# Patient Record
Sex: MALE | Race: BLACK OR AFRICAN AMERICAN | NOT HISPANIC OR LATINO | Employment: FULL TIME | URBAN - METROPOLITAN AREA
[De-identification: names, ages, dates, MRNs, and addresses within clinical notes are randomized per-mention and may not be internally consistent; named-entity substitution may affect disease eponyms.]

---

## 2022-12-12 ENCOUNTER — HOSPITAL ENCOUNTER (EMERGENCY)
Facility: HOSPITAL | Age: 52
Discharge: HOME | End: 2022-12-12
Attending: EMERGENCY MEDICINE

## 2022-12-12 ENCOUNTER — APPOINTMENT (EMERGENCY)
Dept: RADIOLOGY | Facility: HOSPITAL | Age: 52
End: 2022-12-12

## 2022-12-12 VITALS
OXYGEN SATURATION: 98 % | WEIGHT: 255.7 LBS | TEMPERATURE: 97.9 F | RESPIRATION RATE: 18 BRPM | DIASTOLIC BLOOD PRESSURE: 73 MMHG | HEIGHT: 73 IN | SYSTOLIC BLOOD PRESSURE: 121 MMHG | HEART RATE: 78 BPM | BODY MASS INDEX: 33.89 KG/M2

## 2022-12-12 DIAGNOSIS — R07.9 CHEST PAIN: Primary | ICD-10-CM

## 2022-12-12 LAB
2HR DELTA HS TROPONIN: <0 NG/L
ALBUMIN SERPL BCP-MCNC: 3.9 G/DL (ref 3.5–5)
ALP SERPL-CCNC: 92 U/L (ref 46–116)
ALT SERPL W P-5'-P-CCNC: 36 U/L (ref 12–78)
ANION GAP SERPL CALCULATED.3IONS-SCNC: 10 MMOL/L (ref 4–13)
APTT PPP: 25 SECONDS (ref 23–37)
AST SERPL W P-5'-P-CCNC: 21 U/L (ref 5–45)
BASOPHILS # BLD AUTO: 0.03 THOUSANDS/ÂΜL (ref 0–0.1)
BASOPHILS NFR BLD AUTO: 0 % (ref 0–1)
BILIRUB SERPL-MCNC: 1.28 MG/DL (ref 0.2–1)
BUN SERPL-MCNC: 15 MG/DL (ref 5–25)
CALCIUM SERPL-MCNC: 8.4 MG/DL (ref 8.3–10.1)
CARDIAC TROPONIN I PNL SERPL HS: 2 NG/L
CARDIAC TROPONIN I PNL SERPL HS: <2 NG/L
CHLORIDE SERPL-SCNC: 102 MMOL/L (ref 96–108)
CO2 SERPL-SCNC: 27 MMOL/L (ref 21–32)
CREAT SERPL-MCNC: 1.02 MG/DL (ref 0.6–1.3)
D DIMER PPP FEU-MCNC: 0.34 UG/ML FEU
EOSINOPHIL # BLD AUTO: 0.14 THOUSAND/ÂΜL (ref 0–0.61)
EOSINOPHIL NFR BLD AUTO: 1 % (ref 0–6)
ERYTHROCYTE [DISTWIDTH] IN BLOOD BY AUTOMATED COUNT: 12.4 % (ref 11.6–15.1)
GFR SERPL CREATININE-BSD FRML MDRD: 84 ML/MIN/1.73SQ M
GLUCOSE SERPL-MCNC: 122 MG/DL (ref 65–140)
HCT VFR BLD AUTO: 42.4 % (ref 36.5–49.3)
HGB BLD-MCNC: 14.5 G/DL (ref 12–17)
IMM GRANULOCYTES # BLD AUTO: 0.03 THOUSAND/UL (ref 0–0.2)
IMM GRANULOCYTES NFR BLD AUTO: 0 % (ref 0–2)
INR PPP: 1.02 (ref 0.84–1.19)
LYMPHOCYTES # BLD AUTO: 1.98 THOUSANDS/ÂΜL (ref 0.6–4.47)
LYMPHOCYTES NFR BLD AUTO: 20 % (ref 14–44)
MAGNESIUM SERPL-MCNC: 2 MG/DL (ref 1.6–2.6)
MCH RBC QN AUTO: 30.5 PG (ref 26.8–34.3)
MCHC RBC AUTO-ENTMCNC: 34.2 G/DL (ref 31.4–37.4)
MCV RBC AUTO: 89 FL (ref 82–98)
MONOCYTES # BLD AUTO: 0.68 THOUSAND/ÂΜL (ref 0.17–1.22)
MONOCYTES NFR BLD AUTO: 7 % (ref 4–12)
NEUTROPHILS # BLD AUTO: 6.83 THOUSANDS/ÂΜL (ref 1.85–7.62)
NEUTS SEG NFR BLD AUTO: 72 % (ref 43–75)
NRBC BLD AUTO-RTO: 0 /100 WBCS
PLATELET # BLD AUTO: 296 THOUSANDS/UL (ref 149–390)
PMV BLD AUTO: 9.5 FL (ref 8.9–12.7)
POTASSIUM SERPL-SCNC: 3.4 MMOL/L (ref 3.5–5.3)
PROT SERPL-MCNC: 7.3 G/DL (ref 6.4–8.4)
PROTHROMBIN TIME: 13.5 SECONDS (ref 11.6–14.5)
RBC # BLD AUTO: 4.76 MILLION/UL (ref 3.88–5.62)
SODIUM SERPL-SCNC: 139 MMOL/L (ref 135–147)
WBC # BLD AUTO: 9.69 THOUSAND/UL (ref 4.31–10.16)

## 2022-12-12 RX ORDER — AMLODIPINE BESYLATE 10 MG/1
1 TABLET ORAL DAILY
COMMUNITY
Start: 2022-07-18

## 2022-12-12 RX ORDER — POTASSIUM CHLORIDE 20 MEQ/1
20 TABLET, EXTENDED RELEASE ORAL ONCE
Status: COMPLETED | OUTPATIENT
Start: 2022-12-12 | End: 2022-12-12

## 2022-12-12 RX ORDER — NITROGLYCERIN 0.4 MG/1
0.4 TABLET SUBLINGUAL
Status: DISCONTINUED | OUTPATIENT
Start: 2022-12-12 | End: 2022-12-13 | Stop reason: HOSPADM

## 2022-12-12 RX ORDER — LOSARTAN POTASSIUM 50 MG/1
1 TABLET ORAL DAILY
COMMUNITY
Start: 2022-07-18

## 2022-12-12 RX ORDER — ASPIRIN 81 MG/1
324 TABLET, CHEWABLE ORAL ONCE
Status: COMPLETED | OUTPATIENT
Start: 2022-12-12 | End: 2022-12-12

## 2022-12-12 RX ADMIN — NITROGLYCERIN 0.4 MG: 0.4 TABLET SUBLINGUAL at 20:45

## 2022-12-12 RX ADMIN — ASPIRIN 81 MG CHEWABLE TABLET 324 MG: 81 TABLET CHEWABLE at 20:45

## 2022-12-12 RX ADMIN — POTASSIUM CHLORIDE 20 MEQ: 1500 TABLET, EXTENDED RELEASE ORAL at 21:47

## 2022-12-13 LAB
ATRIAL RATE: 95 BPM
P AXIS: 23 DEGREES
PR INTERVAL: 162 MS
QRS AXIS: 27 DEGREES
QRSD INTERVAL: 96 MS
QT INTERVAL: 388 MS
QTC INTERVAL: 487 MS
T WAVE AXIS: 24 DEGREES
VENTRICULAR RATE: 95 BPM

## 2022-12-13 NOTE — ED NOTES
Patient yelled loudly and rang bell at same time,stated he felt dizzy and faint, BP decreased due to nitroglycerin pill,1000 mls of IVF's of normal saline infusing via gravity        Oneita Ariel, SHAQ  12/12/22 [id number]

## 2022-12-13 NOTE — DISCHARGE INSTRUCTIONS
Your tests are normal at this time  Rest as needed  Take your medicines as prescribed  No smoking  Tylenol and/or advil may be used for discomfort    You should see your doctor and/or a cardiologist for a stress test

## 2022-12-13 NOTE — ED PROVIDER NOTES
History  Chief Complaint   Patient presents with   • Chest Pain     Pt  States chest pain right in center of   chest feel like muscle strain  no  Sob, no N/V  Pt  States  has not done any heavy lifting  Has hx of HTN and took extra amlodipine because Bp was high  47 yo male c/o left anterior chest pain that started one hour ago while driving  Pain is like a tightness  Non-radiating  No sob  Not worse with deep breath or movement  Never had before  Pt  Is a   + smoker   + h/o HTN  No high chol or DM  No FH  No recent illness  History provided by:  Patient   used: No    Chest Pain  Associated symptoms: no abdominal pain, no back pain, no cough, no dizziness, no fever, no headache, no nausea, no shortness of breath and not vomiting        Prior to Admission Medications   Prescriptions Last Dose Informant Patient Reported? Taking? amLODIPine (NORVASC) 10 mg tablet 12/12/2022  Yes Yes   Sig: Take 1 tablet by mouth daily   losartan (COZAAR) 50 mg tablet 12/12/2022  Yes Yes   Sig: Take 1 tablet by mouth daily      Facility-Administered Medications: None       Past Medical History:   Diagnosis Date   • High blood pressure        History reviewed  No pertinent surgical history  History reviewed  No pertinent family history  I have reviewed and agree with the history as documented  E-Cigarette/Vaping   • E-Cigarette Use Never User      E-Cigarette/Vaping Substances     Social History     Tobacco Use   • Smoking status: Some Days     Types: Cigarettes   • Smokeless tobacco: Current     Types: Chew   Vaping Use   • Vaping Use: Never used   Substance Use Topics   • Drug use: Never       Review of Systems   Constitutional: Negative  Negative for chills and fever  HENT: Negative  Negative for congestion and sore throat  Eyes: Negative  Respiratory: Negative  Negative for cough and shortness of breath  Cardiovascular: Positive for chest pain   Negative for leg swelling  Gastrointestinal: Negative  Negative for abdominal pain, diarrhea, nausea and vomiting  Genitourinary: Negative  Negative for dysuria, flank pain and hematuria  Musculoskeletal: Negative  Negative for back pain and myalgias  Skin: Negative  Negative for rash and wound  Neurological: Negative  Negative for dizziness and headaches  Psychiatric/Behavioral: Negative  Negative for confusion and hallucinations  The patient is not nervous/anxious  All other systems reviewed and are negative  Physical Exam  Physical Exam  Vitals and nursing note reviewed  Constitutional:       General: He is not in acute distress  Appearance: He is well-developed  He is not ill-appearing or diaphoretic  HENT:      Head: Normocephalic and atraumatic  Right Ear: External ear normal       Left Ear: External ear normal    Eyes:      General: No scleral icterus  Conjunctiva/sclera: Conjunctivae normal    Cardiovascular:      Rate and Rhythm: Normal rate and regular rhythm  Heart sounds: Normal heart sounds  No murmur heard  Pulmonary:      Effort: Pulmonary effort is normal  No respiratory distress  Breath sounds: Normal breath sounds  Chest:      Chest wall: No tenderness  Abdominal:      General: Bowel sounds are normal  There is no distension  Palpations: Abdomen is soft  Tenderness: There is no abdominal tenderness  Musculoskeletal:         General: No tenderness or deformity  Normal range of motion  Cervical back: Normal range of motion and neck supple  Right lower leg: No edema  Left lower leg: No edema  Skin:     General: Skin is warm and dry  Coloration: Skin is not pale  Findings: No erythema or rash  Neurological:      General: No focal deficit present  Mental Status: He is alert and oriented to person, place, and time  Cranial Nerves: No cranial nerve deficit  Motor: No weakness     Psychiatric:         Mood and Affect: Mood normal          Behavior: Behavior normal          Vital Signs  ED Triage Vitals   Temperature Pulse Respirations Blood Pressure SpO2   12/12/22 2034 12/12/22 2029 12/12/22 2029 12/12/22 2029 12/12/22 2029   97 9 °F (36 6 °C) 91 16 144/81 99 %      Temp Source Heart Rate Source Patient Position - Orthostatic VS BP Location FiO2 (%)   12/12/22 2034 12/12/22 2105 12/12/22 2105 12/12/22 2105 --   Oral Monitor Lying Left arm       Pain Score       12/12/22 2029       5           Vitals:    12/12/22 2200 12/12/22 2230 12/12/22 2245 12/12/22 2300   BP: 157/88 115/57 115/57 121/73   Pulse: 76 76 75 78   Patient Position - Orthostatic VS:  Sitting           Visual Acuity      ED Medications  Medications   nitroglycerin (NITROSTAT) SL tablet 0 4 mg (0 4 mg Sublingual Given 12/12/22 2045)   morphine injection 2 mg (2 mg Intravenous Not Given 12/12/22 2147)   aspirin chewable tablet 324 mg (324 mg Oral Given 12/12/22 2045)   potassium chloride (K-DUR,KLOR-CON) CR tablet 20 mEq (20 mEq Oral Given 12/12/22 2147)       Diagnostic Studies  Results Reviewed     Procedure Component Value Units Date/Time    HS Troponin I 2hr [[id number]]  (Normal) Collected: 12/12/22 2238    Lab Status: Final result Specimen: Blood from Arm, Right Updated: 12/12/22 2306     hs TnI 2hr <2 ng/L      Delta 2hr hsTnI <0 ng/L     HS Troponin I 4hr [[id number]]     Lab Status: No result Specimen: Blood     HS Troponin 0hr (reflex protocol) [[id number]]  (Normal) Collected: 12/12/22 2047    Lab Status: Final result Specimen: Blood from Arm, Right Updated: 12/12/22 2119     hs TnI 0hr 2 ng/L     Comprehensive metabolic panel [[id number]]  (Abnormal) Collected: 12/12/22 2047    Lab Status: Final result Specimen: Blood from Arm, Right Updated: 12/12/22 2111     Sodium 139 mmol/L      Potassium 3 4 mmol/L      Chloride 102 mmol/L      CO2 27 mmol/L      ANION GAP 10 mmol/L      BUN 15 mg/dL      Creatinine 1 02 mg/dL      Glucose 122 mg/dL Calcium 8 4 mg/dL      AST 21 U/L      ALT 36 U/L      Alkaline Phosphatase 92 U/L      Total Protein 7 3 g/dL      Albumin 3 9 g/dL      Total Bilirubin 1 28 mg/dL      eGFR 84 ml/min/1 73sq m     Narrative:      [place] guidelines for Chronic Kidney Disease (CKD):   •  Stage 1 with normal or high GFR (GFR > 90 mL/min/1 73 square meters)  •  Stage 2 Mild CKD (GFR = 60-89 mL/min/1 73 square meters)  •  Stage 3A Moderate CKD (GFR = 45-59 mL/min/1 73 square meters)  •  Stage 3B Moderate CKD (GFR = 30-44 mL/min/1 73 square meters)  •  Stage 4 Severe CKD (GFR = 15-29 mL/min/1 73 square meters)  •  Stage 5 End Stage CKD (GFR <15 mL/min/1 73 square meters)  Note: GFR calculation is accurate only with a steady state creatinine    Magnesium [[id number]]  (Normal) Collected: 12/12/22 2047    Lab Status: Final result Specimen: Blood from Arm, Right Updated: 12/12/22 2111     Magnesium 2 0 mg/dL     D-Dimer [[id number]]  (Normal) Collected: 12/12/22 2047    Lab Status: Final result Specimen: Blood from Arm, Right Updated: 12/12/22 2108     D-Dimer, Quant 0 34 ug/ml FEU     Narrative: In the evaluation for possible pulmonary embolism, in the appropriate (Well's Score of 4 or less) patient, the age adjusted d-dimer cutoff for this patient can be calculated as:    Age x 0 01 (in ug/mL) for Age-adjusted D-dimer exclusion threshold for a patient over 50 years      Protime-INR [[id number]]  (Normal) Collected: 12/12/22 2047    Lab Status: Final result Specimen: Blood from Arm, Right Updated: 12/12/22 2107     Protime 13 5 seconds      INR 1 02    APTT [[id number]]  (Normal) Collected: 12/12/22 2047    Lab Status: Final result Specimen: Blood from Arm, Right Updated: 12/12/22 2107     PTT 25 seconds     CBC and differential [[id number]] Collected: 12/12/22 2047    Lab Status: Final result Specimen: Blood from Arm, Right Updated: 12/12/22 2053     WBC 9 69 Thousand/uL      RBC 4 76 Million/uL      Hemoglobin 14 5 g/dL      Hematocrit 42 4 %      MCV 89 fL      MCH 30 5 pg      MCHC 34 2 g/dL      RDW 12 4 %      MPV 9 5 fL      Platelets 475 Thousands/uL      nRBC 0 /100 WBCs      Neutrophils Relative 72 %      Immat GRANS % 0 %      Lymphocytes Relative 20 %      Monocytes Relative 7 %      Eosinophils Relative 1 %      Basophils Relative 0 %      Neutrophils Absolute 6 83 Thousands/µL      Immature Grans Absolute 0 03 Thousand/uL      Lymphocytes Absolute 1 98 Thousands/µL      Monocytes Absolute 0 68 Thousand/µL      Eosinophils Absolute 0 14 Thousand/µL      Basophils Absolute 0 03 Thousands/µL                  XR chest 1 view portable    (Results Pending)              Procedures  ECG 12 Lead Documentation Only    Date/Time: 12/12/2022 8:28 PM  Performed by: Kayley Leyva MD  Authorized by: Kayley Leyva MD     Indications / Diagnosis:  Chest pain  ECG reviewed by me, the ED Provider: no    Patient location:  ED  Previous ECG:     Previous ECG:  Unavailable  Interpretation:     Interpretation: abnormal    Rate:     ECG rate:  95    ECG rate assessment: normal    Rhythm:     Rhythm: sinus rhythm    Ectopy:     Ectopy: none    QRS:     QRS axis:  Normal  Conduction:     Conduction: abnormal      Abnormal conduction: incomplete RBBB    ST segments:     ST segments:  Normal  T waves:     T waves: normal    Other findings:     Other findings: prolonged qTc interval               ED Course             HEART Risk Score    Flowsheet Row Most Recent Value   Heart Score Risk Calculator    History 1 Filed at: 12/12/2022 2320   ECG 0 Filed at: 12/12/2022 2320   Age 1 Filed at: 12/12/2022 2320   Risk Factors 1 Filed at: 12/12/2022 2320   Troponin 0 Filed at: 12/12/2022 2320   HEART Score 3 Filed at: 12/12/2022 2320                                      MDM  Number of Diagnoses or Management Options  Chest pain  Diagnosis management comments: Pain improved  Heart score 3  Advised rest/tylenol/advil prn, follow up outpt   For stress test or return to ER if worsening  Disposition  Final diagnoses:   Chest pain     Time reflects when diagnosis was documented in both MDM as applicable and the Disposition within this note     Time User Action Codes Description Comment    47/23/1627 97:49 PM Sydna Blizzard Add [S54 9] Chest pain       ED Disposition     ED Disposition   Discharge    Condition   Stable    Date/Time   Mon Dec 12, 2022 11:21 PM    Comment   Dong Andrew discharge to home/self care  Follow-up Information     Follow up With Specialties Details Why Contact Info    your doctor  Schedule an appointment as soon as possible for a visit             Patient's Medications   Discharge Prescriptions    No medications on file       No discharge procedures on file      PDMP Review     None          ED Provider  Electronically Signed by           Jennifer Tran MD  80/20/05 6448

## 2022-12-13 NOTE — ED NOTES
Patient was brought pain medication and when informed it was morphine, he declined stating he has to drive tonight and does not want it       Mark Hope RN  12/12/22 0999